# Patient Record
Sex: MALE | Race: WHITE | NOT HISPANIC OR LATINO | URBAN - METROPOLITAN AREA
[De-identification: names, ages, dates, MRNs, and addresses within clinical notes are randomized per-mention and may not be internally consistent; named-entity substitution may affect disease eponyms.]

---

## 2017-10-01 ENCOUNTER — EMERGENCY (EMERGENCY)
Facility: HOSPITAL | Age: 52
LOS: 1 days | Discharge: HOME | End: 2017-10-01
Attending: EMERGENCY MEDICINE | Admitting: EMERGENCY MEDICINE
Payer: COMMERCIAL

## 2017-10-01 VITALS
TEMPERATURE: 98 F | DIASTOLIC BLOOD PRESSURE: 99 MMHG | HEART RATE: 88 BPM | OXYGEN SATURATION: 100 % | RESPIRATION RATE: 18 BRPM | SYSTOLIC BLOOD PRESSURE: 139 MMHG

## 2017-10-01 LAB
ALBUMIN SERPL ELPH-MCNC: 4.9 G/DL — SIGNIFICANT CHANGE UP (ref 3.3–5)
ALP SERPL-CCNC: 50 U/L — SIGNIFICANT CHANGE UP (ref 40–120)
ALT FLD-CCNC: 27 U/L — SIGNIFICANT CHANGE UP (ref 4–41)
AST SERPL-CCNC: 27 U/L — SIGNIFICANT CHANGE UP (ref 4–40)
BASOPHILS # BLD AUTO: 0.07 K/UL — SIGNIFICANT CHANGE UP (ref 0–0.2)
BASOPHILS NFR BLD AUTO: 1.3 % — SIGNIFICANT CHANGE UP (ref 0–2)
BILIRUB SERPL-MCNC: 0.3 MG/DL — SIGNIFICANT CHANGE UP (ref 0.2–1.2)
BUN SERPL-MCNC: 15 MG/DL — SIGNIFICANT CHANGE UP (ref 7–23)
CALCIUM SERPL-MCNC: 9 MG/DL — SIGNIFICANT CHANGE UP (ref 8.4–10.5)
CHLORIDE SERPL-SCNC: 102 MMOL/L — SIGNIFICANT CHANGE UP (ref 98–107)
CK MB BLD-MCNC: 1.1 — SIGNIFICANT CHANGE UP (ref 0–2.5)
CK MB BLD-MCNC: 1.3 — SIGNIFICANT CHANGE UP (ref 0–2.5)
CK MB BLD-MCNC: 2.5 NG/ML — SIGNIFICANT CHANGE UP (ref 1–6.6)
CK MB BLD-MCNC: 3.22 NG/ML — SIGNIFICANT CHANGE UP (ref 1–6.6)
CK SERPL-CCNC: 195 U/L — SIGNIFICANT CHANGE UP (ref 30–200)
CK SERPL-CCNC: 283 U/L — HIGH (ref 30–200)
CO2 SERPL-SCNC: 25 MMOL/L — SIGNIFICANT CHANGE UP (ref 22–31)
CREAT SERPL-MCNC: 1.35 MG/DL — HIGH (ref 0.5–1.3)
EOSINOPHIL # BLD AUTO: 0.15 K/UL — SIGNIFICANT CHANGE UP (ref 0–0.5)
EOSINOPHIL NFR BLD AUTO: 2.7 % — SIGNIFICANT CHANGE UP (ref 0–6)
GLUCOSE SERPL-MCNC: 97 MG/DL — SIGNIFICANT CHANGE UP (ref 70–99)
HBA1C BLD-MCNC: 5.7 % — HIGH (ref 4–5.6)
HCT VFR BLD CALC: 44.7 % — SIGNIFICANT CHANGE UP (ref 39–50)
HGB BLD-MCNC: 15.1 G/DL — SIGNIFICANT CHANGE UP (ref 13–17)
IMM GRANULOCYTES # BLD AUTO: 0.03 # — SIGNIFICANT CHANGE UP
IMM GRANULOCYTES NFR BLD AUTO: 0.5 % — SIGNIFICANT CHANGE UP (ref 0–1.5)
LIDOCAIN IGE QN: 71.9 U/L — HIGH (ref 7–60)
LYMPHOCYTES # BLD AUTO: 1.58 K/UL — SIGNIFICANT CHANGE UP (ref 1–3.3)
LYMPHOCYTES # BLD AUTO: 28.9 % — SIGNIFICANT CHANGE UP (ref 13–44)
MCHC RBC-ENTMCNC: 30.2 PG — SIGNIFICANT CHANGE UP (ref 27–34)
MCHC RBC-ENTMCNC: 33.8 % — SIGNIFICANT CHANGE UP (ref 32–36)
MCV RBC AUTO: 89.4 FL — SIGNIFICANT CHANGE UP (ref 80–100)
MONOCYTES # BLD AUTO: 0.49 K/UL — SIGNIFICANT CHANGE UP (ref 0–0.9)
MONOCYTES NFR BLD AUTO: 9 % — SIGNIFICANT CHANGE UP (ref 2–14)
NEUTROPHILS # BLD AUTO: 3.14 K/UL — SIGNIFICANT CHANGE UP (ref 1.8–7.4)
NEUTROPHILS NFR BLD AUTO: 57.6 % — SIGNIFICANT CHANGE UP (ref 43–77)
NRBC # FLD: 0 — SIGNIFICANT CHANGE UP
NT-PROBNP SERPL-SCNC: 5 PG/ML — SIGNIFICANT CHANGE UP
NT-PROBNP SERPL-SCNC: 5.26 PG/ML — SIGNIFICANT CHANGE UP
PLATELET # BLD AUTO: 217 K/UL — SIGNIFICANT CHANGE UP (ref 150–400)
PMV BLD: 8.3 FL — SIGNIFICANT CHANGE UP (ref 7–13)
POTASSIUM SERPL-MCNC: 4.1 MMOL/L — SIGNIFICANT CHANGE UP (ref 3.5–5.3)
POTASSIUM SERPL-SCNC: 4.1 MMOL/L — SIGNIFICANT CHANGE UP (ref 3.5–5.3)
PROT SERPL-MCNC: 7.8 G/DL — SIGNIFICANT CHANGE UP (ref 6–8.3)
RBC # BLD: 5 M/UL — SIGNIFICANT CHANGE UP (ref 4.2–5.8)
RBC # FLD: 12.8 % — SIGNIFICANT CHANGE UP (ref 10.3–14.5)
SODIUM SERPL-SCNC: 142 MMOL/L — SIGNIFICANT CHANGE UP (ref 135–145)
TROPONIN T SERPL-MCNC: < 0.06 NG/ML — SIGNIFICANT CHANGE UP (ref 0–0.06)
TROPONIN T SERPL-MCNC: < 0.06 NG/ML — SIGNIFICANT CHANGE UP (ref 0–0.06)
WBC # BLD: 5.46 K/UL — SIGNIFICANT CHANGE UP (ref 3.8–10.5)
WBC # FLD AUTO: 5.46 K/UL — SIGNIFICANT CHANGE UP (ref 3.8–10.5)

## 2017-10-01 PROCEDURE — 99218: CPT

## 2017-10-01 PROCEDURE — 71010: CPT | Mod: 26

## 2017-10-01 RX ORDER — FLUTICASONE PROPIONATE 50 MCG
1 SPRAY, SUSPENSION NASAL
Qty: 0 | Refills: 0 | Status: DISCONTINUED | OUTPATIENT
Start: 2017-10-01 | End: 2017-10-05

## 2017-10-01 RX ORDER — PANTOPRAZOLE SODIUM 20 MG/1
40 TABLET, DELAYED RELEASE ORAL
Qty: 0 | Refills: 0 | Status: DISCONTINUED | OUTPATIENT
Start: 2017-10-01 | End: 2017-10-05

## 2017-10-01 RX ORDER — MOMETASONE FUROATE 220 UG/1
1 INHALANT RESPIRATORY (INHALATION) DAILY
Qty: 0 | Refills: 0 | Status: DISCONTINUED | OUTPATIENT
Start: 2017-10-01 | End: 2017-10-05

## 2017-10-01 RX ORDER — NITROGLYCERIN 6.5 MG
0.4 CAPSULE, EXTENDED RELEASE ORAL ONCE
Qty: 0 | Refills: 0 | Status: COMPLETED | OUTPATIENT
Start: 2017-10-01 | End: 2017-10-01

## 2017-10-01 RX ORDER — MOMETASONE FUROATE 220 UG/1
1 INHALANT RESPIRATORY (INHALATION)
Qty: 0 | Refills: 0 | Status: DISCONTINUED | OUTPATIENT
Start: 2017-10-01 | End: 2017-10-01

## 2017-10-01 RX ADMIN — Medication 0.4 MILLIGRAM(S): at 11:28

## 2017-10-01 RX ADMIN — MOMETASONE FUROATE 1 PUFF(S): 220 INHALANT RESPIRATORY (INHALATION) at 17:22
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
## 2017-10-01 NOTE — ED PROVIDER NOTE - ATTENDING CONTRIBUTION TO CARE
Attending Note (Katy): patient with chest pain, typical sounding. cv rrr. heart score 4. admit to cdu. ekg unchanged from previous day had outpatient ekg for routine follow up

## 2017-10-01 NOTE — ED PROVIDER NOTE - PROGRESS NOTE DETAILS
DARIUS Beauchamp: Pt resting comfortably in Bed NAD, family at bedside.  Pt on cardiac monitor.  Cardiac Enzymes x 2 negative.  Pt scheduled for stress test in AM.  Will continue to monitor.
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
## 2017-10-01 NOTE — ED PROVIDER NOTE - MEDICAL DECISION MAKING DETAILS
51 yo M p/w chest pain beginning at 8 AM.  GI vs ACS vs musculoskeletal   Plan: cardiac enzymes, CMP/lipase for GI origin

## 2017-10-01 NOTE — ED ADULT TRIAGE NOTE - CHIEF COMPLAINT QUOTE
pt reports left sided CP radiating into his jaw and left arm, pt appears uncomfortable in triage. Patient denies PMH aside from decreased lung function from working at the [employer]. Patient took 3 ADULT ASA PTA.

## 2017-10-02 VITALS
DIASTOLIC BLOOD PRESSURE: 92 MMHG | OXYGEN SATURATION: 97 % | SYSTOLIC BLOOD PRESSURE: 125 MMHG | TEMPERATURE: 98 F | RESPIRATION RATE: 18 BRPM | HEART RATE: 96 BPM

## 2017-10-02 PROCEDURE — 99217: CPT

## 2017-10-02 PROCEDURE — 93018 CV STRESS TEST I&R ONLY: CPT | Mod: GC

## 2017-10-02 PROCEDURE — 78452 HT MUSCLE IMAGE SPECT MULT: CPT | Mod: 26

## 2017-10-02 PROCEDURE — 93016 CV STRESS TEST SUPVJ ONLY: CPT | Mod: GC

## 2017-10-02 RX ADMIN — Medication 1 SPRAY(S): at 06:35

## 2017-10-02 RX ADMIN — MOMETASONE FUROATE 1 PUFF(S): 220 INHALANT RESPIRATORY (INHALATION) at 11:00

## 2017-10-02 RX ADMIN — PANTOPRAZOLE SODIUM 40 MILLIGRAM(S): 20 TABLET, DELAYED RELEASE ORAL at 06:35

## 2022-12-06 PROBLEM — J98.4 OTHER DISORDERS OF LUNG: Chronic | Status: ACTIVE | Noted: 2017-10-01

## 2023-01-05 ENCOUNTER — NON-APPOINTMENT (OUTPATIENT)
Age: 58
End: 2023-01-05

## 2023-01-05 ENCOUNTER — APPOINTMENT (OUTPATIENT)
Dept: OTOLARYNGOLOGY | Facility: CLINIC | Age: 58
End: 2023-01-05
Payer: COMMERCIAL

## 2023-01-05 VITALS
SYSTOLIC BLOOD PRESSURE: 123 MMHG | TEMPERATURE: 97.6 F | HEIGHT: 71 IN | HEART RATE: 69 BPM | DIASTOLIC BLOOD PRESSURE: 84 MMHG | BODY MASS INDEX: 30.8 KG/M2 | WEIGHT: 220 LBS

## 2023-01-05 DIAGNOSIS — J34.2 DEVIATED NASAL SEPTUM: ICD-10-CM

## 2023-01-05 DIAGNOSIS — R09.89 OTHER SPECIFIED SYMPTOMS AND SIGNS INVOLVING THE CIRCULATORY AND RESPIRATORY SYSTEMS: ICD-10-CM

## 2023-01-05 DIAGNOSIS — H92.13 OTORRHEA, BILATERAL: ICD-10-CM

## 2023-01-05 DIAGNOSIS — H93.293 OTHER ABNORMAL AUDITORY PERCEPTIONS, BILATERAL: ICD-10-CM

## 2023-01-05 PROCEDURE — 31231 NASAL ENDOSCOPY DX: CPT

## 2023-01-05 PROCEDURE — 92567 TYMPANOMETRY: CPT

## 2023-01-05 PROCEDURE — 92557 COMPREHENSIVE HEARING TEST: CPT

## 2023-01-05 PROCEDURE — 99204 OFFICE O/P NEW MOD 45 MIN: CPT | Mod: 25

## 2023-01-05 RX ORDER — OFLOXACIN OTIC 3 MG/ML
0.3 SOLUTION AURICULAR (OTIC) TWICE DAILY
Qty: 1 | Refills: 2 | Status: ACTIVE | COMMUNITY
Start: 2023-01-05 | End: 1900-01-01

## 2023-01-05 RX ORDER — FLUTICASONE PROPIONATE 50 MCG
SPRAY, SUSPENSION NASAL
Refills: 0 | Status: ACTIVE | COMMUNITY

## 2023-01-05 RX ORDER — AZELASTINE HYDROCHLORIDE 137 UG/1
137 SPRAY, METERED NASAL DAILY
Qty: 3 | Refills: 3 | Status: ACTIVE | COMMUNITY
Start: 2023-01-05 | End: 1900-01-01

## 2023-01-05 NOTE — REVIEW OF SYSTEMS
[Hearing Loss] : hearing loss [Ear Drainage] : ear drainage [Nasal Congestion] : nasal congestion [Recurrent Sinus Infections] : recurrent sinus infections [Negative] : Heme/Lymph
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
## 2023-01-05 NOTE — ASSESSMENT
[FreeTextEntry1] : Patient long history of nasal congestion and sinus issues 911 been doing followed by team here because of diminished hearing wants a hearing check also feels some drainage in his left ear intermittently on examination everything looks fine no wax no infection I put him on Floxin otic drops to see if that will help him endoscopically has a deviated septum to the right I encouraged him to add azelastine to his Flonase regiment.  Audio shows a dip at 4000 Hz bilaterally symmetrical in nature similar to his hearing test back in 2014 normal hearing in the speech frequencies.

## 2023-01-05 NOTE — CONSULT LETTER
[Dear  ___] : Dear  [unfilled], [Consult Letter:] : I had the pleasure of evaluating your patient, [unfilled]. [Please see my note below.] : Please see my note below. [Consult Closing:] : Thank you very much for allowing me to participate in the care of this patient.  If you have any questions, please do not hesitate to contact me. [Sincerely,] : Sincerely, [FreeTextEntry3] : Ryan Briscoe MD\par [facility]\par Otolaryngology and Facial Plastics\par [employer], [employer]\par

## 2023-01-05 NOTE — END OF VISIT
[FreeTextEntry3] : I, Dr. Briscoe personally performed the evaluation and management (E/M) services including all necessary procedures, for this new patient. That E/M includes conducting the clinically appropriate initial history &/or exam, assessing all conditions, and establishing the plan of care. Today, my BABITA, Zaina Quinn, was here to observe &/or participate in the visit & follow plan of care established by me.\par \par \par

## 2025-06-23 ENCOUNTER — APPOINTMENT (OUTPATIENT)
Age: 60
End: 2025-06-23
Payer: COMMERCIAL

## 2025-06-23 ENCOUNTER — NON-APPOINTMENT (OUTPATIENT)
Age: 60
End: 2025-06-23

## 2025-06-23 PROCEDURE — 92136 OPHTHALMIC BIOMETRY: CPT

## 2025-06-23 PROCEDURE — 92004 COMPRE OPH EXAM NEW PT 1/>: CPT

## 2025-06-23 PROCEDURE — 92020 GONIOSCOPY: CPT

## 2025-06-23 PROCEDURE — 92202 OPSCPY EXTND ON/MAC DRAW: CPT

## 2025-06-23 PROCEDURE — 92134 CPTRZ OPH DX IMG PST SGM RTA: CPT

## 2025-07-28 ENCOUNTER — NON-APPOINTMENT (OUTPATIENT)
Age: 60
End: 2025-07-28

## 2025-07-28 ENCOUNTER — APPOINTMENT (OUTPATIENT)
Age: 60
End: 2025-07-28
Payer: COMMERCIAL

## 2025-07-28 PROCEDURE — 92202 OPSCPY EXTND ON/MAC DRAW: CPT

## 2025-07-28 PROCEDURE — 92012 INTRM OPH EXAM EST PATIENT: CPT

## 2025-09-18 ENCOUNTER — NON-APPOINTMENT (OUTPATIENT)
Age: 60
End: 2025-09-18

## 2025-09-18 ENCOUNTER — APPOINTMENT (OUTPATIENT)
Age: 60
End: 2025-09-18
Payer: COMMERCIAL

## 2025-09-18 PROCEDURE — 66982 XCAPSL CTRC RMVL CPLX WO ECP: CPT | Mod: RT

## 2025-09-19 ENCOUNTER — APPOINTMENT (OUTPATIENT)
Age: 60
End: 2025-09-19
Payer: COMMERCIAL

## 2025-09-19 ENCOUNTER — NON-APPOINTMENT (OUTPATIENT)
Age: 60
End: 2025-09-19

## 2025-09-19 PROCEDURE — 99024 POSTOP FOLLOW-UP VISIT: CPT
